# Patient Record
Sex: FEMALE | Race: WHITE | Employment: OTHER | URBAN - METROPOLITAN AREA
[De-identification: names, ages, dates, MRNs, and addresses within clinical notes are randomized per-mention and may not be internally consistent; named-entity substitution may affect disease eponyms.]

---

## 2021-11-20 ENCOUNTER — APPOINTMENT (OUTPATIENT)
Dept: GENERAL RADIOLOGY | Facility: CLINIC | Age: 66
End: 2021-11-20
Attending: EMERGENCY MEDICINE
Payer: COMMERCIAL

## 2021-11-20 ENCOUNTER — HOSPITAL ENCOUNTER (EMERGENCY)
Facility: CLINIC | Age: 66
Discharge: HOME | End: 2021-11-20
Attending: EMERGENCY MEDICINE | Admitting: EMERGENCY MEDICINE
Payer: COMMERCIAL

## 2021-11-20 VITALS
HEART RATE: 87 BPM | DIASTOLIC BLOOD PRESSURE: 67 MMHG | OXYGEN SATURATION: 95 % | SYSTOLIC BLOOD PRESSURE: 104 MMHG | RESPIRATION RATE: 16 BRPM | TEMPERATURE: 98.2 F

## 2021-11-20 DIAGNOSIS — S70.01XA CONTUSION OF RIGHT HIP, INITIAL ENCOUNTER: ICD-10-CM

## 2021-11-20 LAB
ANION GAP SERPL CALCULATED.3IONS-SCNC: 5 MMOL/L (ref 3–14)
BASOPHILS # BLD AUTO: 0 10E3/UL (ref 0–0.2)
BASOPHILS NFR BLD AUTO: 0 %
BUN SERPL-MCNC: 18 MG/DL (ref 7–30)
CALCIUM SERPL-MCNC: 8.6 MG/DL (ref 8.5–10.1)
CHLORIDE BLD-SCNC: 108 MMOL/L (ref 94–109)
CO2 SERPL-SCNC: 28 MMOL/L (ref 20–32)
CREAT SERPL-MCNC: 0.93 MG/DL (ref 0.52–1.04)
EOSINOPHIL # BLD AUTO: 0 10E3/UL (ref 0–0.7)
EOSINOPHIL NFR BLD AUTO: 0 %
ERYTHROCYTE [DISTWIDTH] IN BLOOD BY AUTOMATED COUNT: 12.5 % (ref 10–15)
GFR SERPL CREATININE-BSD FRML MDRD: 65 ML/MIN/1.73M2
GLUCOSE BLD-MCNC: 90 MG/DL (ref 70–99)
HCT VFR BLD AUTO: 37.2 % (ref 35–47)
HGB BLD-MCNC: 12.2 G/DL (ref 11.7–15.7)
IMM GRANULOCYTES # BLD: 0.1 10E3/UL
IMM GRANULOCYTES NFR BLD: 1 %
LYMPHOCYTES # BLD AUTO: 0.9 10E3/UL (ref 0.8–5.3)
LYMPHOCYTES NFR BLD AUTO: 10 %
MCH RBC QN AUTO: 29.5 PG (ref 26.5–33)
MCHC RBC AUTO-ENTMCNC: 32.8 G/DL (ref 31.5–36.5)
MCV RBC AUTO: 90 FL (ref 78–100)
MONOCYTES # BLD AUTO: 0.9 10E3/UL (ref 0–1.3)
MONOCYTES NFR BLD AUTO: 9 %
NEUTROPHILS # BLD AUTO: 7.7 10E3/UL (ref 1.6–8.3)
NEUTROPHILS NFR BLD AUTO: 80 %
NRBC # BLD AUTO: 0 10E3/UL
NRBC BLD AUTO-RTO: 0 /100
PLATELET # BLD AUTO: 210 10E3/UL (ref 150–450)
POTASSIUM BLD-SCNC: 3.4 MMOL/L (ref 3.4–5.3)
RBC # BLD AUTO: 4.14 10E6/UL (ref 3.8–5.2)
SODIUM SERPL-SCNC: 141 MMOL/L (ref 133–144)
WBC # BLD AUTO: 9.6 10E3/UL (ref 4–11)

## 2021-11-20 PROCEDURE — 85025 COMPLETE CBC W/AUTO DIFF WBC: CPT | Performed by: EMERGENCY MEDICINE

## 2021-11-20 PROCEDURE — 99284 EMERGENCY DEPT VISIT MOD MDM: CPT

## 2021-11-20 PROCEDURE — 36415 COLL VENOUS BLD VENIPUNCTURE: CPT | Performed by: EMERGENCY MEDICINE

## 2021-11-20 PROCEDURE — 73502 X-RAY EXAM HIP UNI 2-3 VIEWS: CPT

## 2021-11-20 PROCEDURE — 80048 BASIC METABOLIC PNL TOTAL CA: CPT | Performed by: EMERGENCY MEDICINE

## 2021-11-20 NOTE — ED PROVIDER NOTES
History   Chief Complaint:  Fall    The history is provided by the spouse.      Rodger Paniagua is a 66 year old female with history of severe dementia, nonverbal at baseline, who presents with her  following a fall. The patient's  reports that Rodger falls approximately once each week, but the falls are controlled and she is lowered softly to the ground. However, while getting Rodger dressed yesterday at about 1100, she slid off the bed, hitting her buttocks on the floor.  She also apparently softly bumped her head.  She got up and seemed okay.  He tried to take her for a walk today, but she seemed to be favoring the right leg and was not able to walk as far as she normally would.  Therefore he brings her in due to concern about a possible right hip fracture.  He is not concerned about any other injuries including a head injury.    Review of Systems   Unable to perform ROS: Dementia   All other systems reviewed and are negative.      Allergies:  The patient has no known allergies.     Medications:  Seroquel     Past Medical History:    Severe dementia    Past Surgical History:    CTR     Social History:  The patient presents to the ED with her     Physical Exam     Patient Vitals for the past 24 hrs:   BP Temp Temp src Pulse Resp SpO2   11/20/21 1158 -- 98.2  F (36.8  C) Temporal -- 16 --   11/20/21 1148 104/67 -- -- 87 -- 95 %       Physical Exam  Nursing note and vitals reviewed.  Constitutional:  Awake and alert, but demented.  Nonverbal.   HENT:   Head:   Atraumatic.  Nose:    Nose normal.   Mouth/Throat:   Mucous membranes are normal.   Eyes:    Conjunctivae normal and EOM are normal.      Pupils are equal, round, and reactive to light.   Neck:    Trachea normal.   Cardiovascular:  Normal rate, regular rhythm, normal heart sounds and normal pulses. No murmur heard.  Pulmonary/Chest:  Effort normal and breath sounds normal.   Abdominal:   Soft. Normal appearance and bowel sounds are normal.       There is no tenderness.      There is no rebound and no CVA tenderness.   Musculoskeletal:  No apparent tenderness to palpation over the right hip or pelvic region.  Full range of motion of the right hip.  Legs are of equal length.  No apparent cervical spine tenderness to palpation.  Extremities atraumatic x 4.   Lymphadenopathy:  No cervical adenopathy.   Neurological:   Awake and alert, but demented and nonverbal. Normal strength.      No cranial nerve deficit or sensory deficit. GCS eye subscore is 4.      GCS verbal subscore is 5. GCS motor subscore is 6.   Skin:    Skin is intact. No rash noted.   Psychiatric:   Nonverbal.    Emergency Department Course     Imaging:  XR pelvis with hip right 1 view  Anatomic alignment right hip. No acute displaced right hip fracture. Largely preserved hip joint spaces for age. No acute displaced pelvic fracture identified. Degenerative change lower lumbar spine.  As per radiology.    Laboratory:  CBC: WBC 9.6, HGB 12.2,      BMP: WNL (Creatinine 0.93)     Emergency Department Course:    Reviewed:  I reviewed nursing notes, vitals, past medical history and care everywhere    Assessments:  1149 I obtained history and examined the patient as noted above.   1200 I rechecked the patient and explained findings.     Disposition:  The patient was discharged to home.     Impression & Plan     Medical Decision Making:  This is a 66-year-old female brought in by her  due to concerns about a possible hip fracture after falling gently yesterday.  My suspicion was low for a hip fracture, but certainly that was possible, as well as a pubic ramus fracture.  Therefore I proceeded with the above x-rays, as well as the above blood work to make sure that she was not anemic or have any electrolyte disturbance.  Her work-up here is unremarkable, which is reassuring.  I suspect she has a contusion rather than anything more serious such as an occult fracture.  Her  is a  retired emergency room physician, and he knows what to look out for and reasons to return.    Covid-19  Rodger Paniagua was evaluated during a global COVID-19 pandemic, which necessitated consideration that the patient might be at risk for infection with the SARS-CoV-2 virus that causes COVID-19.   Applicable protocols for evaluation were followed during the patient's care.     Diagnosis:    ICD-10-CM    1. Contusion of right hip, initial encounter  S70.01XA        Discharge Medications:  None    Scribe Disclosure:  I, Kev Ramos, am serving as a scribe at 11:46 AM on 11/20/2021 to document services personally performed by Callum Ely MD based on my observations and the provider's statements to me.          Callum Ely MD  11/20/21 [id number]